# Patient Record
Sex: MALE | Race: WHITE | NOT HISPANIC OR LATINO | Employment: FULL TIME | URBAN - METROPOLITAN AREA
[De-identification: names, ages, dates, MRNs, and addresses within clinical notes are randomized per-mention and may not be internally consistent; named-entity substitution may affect disease eponyms.]

---

## 2022-08-05 ENCOUNTER — OFFICE VISIT (OUTPATIENT)
Dept: INTERNAL MEDICINE CLINIC | Facility: CLINIC | Age: 62
End: 2022-08-05
Payer: COMMERCIAL

## 2022-08-05 VITALS
RESPIRATION RATE: 18 BRPM | HEART RATE: 85 BPM | TEMPERATURE: 97.8 F | BODY MASS INDEX: 23 KG/M2 | HEIGHT: 69 IN | OXYGEN SATURATION: 98 % | WEIGHT: 155.3 LBS | DIASTOLIC BLOOD PRESSURE: 88 MMHG | SYSTOLIC BLOOD PRESSURE: 120 MMHG

## 2022-08-05 DIAGNOSIS — Z11.4 SCREENING FOR HIV (HUMAN IMMUNODEFICIENCY VIRUS): ICD-10-CM

## 2022-08-05 DIAGNOSIS — Z23 ENCOUNTER FOR IMMUNIZATION: ICD-10-CM

## 2022-08-05 DIAGNOSIS — Z00.00 ANNUAL PHYSICAL EXAM: ICD-10-CM

## 2022-08-05 DIAGNOSIS — M62.838 MUSCLE SPASMS OF NECK: Primary | ICD-10-CM

## 2022-08-05 DIAGNOSIS — R03.0 ELEVATED BLOOD-PRESSURE READING WITHOUT DIAGNOSIS OF HYPERTENSION: ICD-10-CM

## 2022-08-05 DIAGNOSIS — F17.210 TOBACCO DEPENDENCE DUE TO CIGARETTES: ICD-10-CM

## 2022-08-05 DIAGNOSIS — Z11.59 NEED FOR HEPATITIS C SCREENING TEST: ICD-10-CM

## 2022-08-05 PROBLEM — Z12.2 SCREENING FOR LUNG CANCER: Status: ACTIVE | Noted: 2022-08-05

## 2022-08-05 PROCEDURE — 99203 OFFICE O/P NEW LOW 30 MIN: CPT | Performed by: STUDENT IN AN ORGANIZED HEALTH CARE EDUCATION/TRAINING PROGRAM

## 2022-08-05 PROCEDURE — 90471 IMMUNIZATION ADMIN: CPT | Performed by: STUDENT IN AN ORGANIZED HEALTH CARE EDUCATION/TRAINING PROGRAM

## 2022-08-05 PROCEDURE — 3725F SCREEN DEPRESSION PERFORMED: CPT | Performed by: STUDENT IN AN ORGANIZED HEALTH CARE EDUCATION/TRAINING PROGRAM

## 2022-08-05 PROCEDURE — 90677 PCV20 VACCINE IM: CPT | Performed by: STUDENT IN AN ORGANIZED HEALTH CARE EDUCATION/TRAINING PROGRAM

## 2022-08-05 NOTE — PROGRESS NOTES
INTERNAL MEDICINE OFFICE VISIT NOTE  [facility]    NAME: Yazmin Cm  AGE: 57 y o  SEX: male    DATE OF ENCOUNTER: 8/5/2022      Chief Complaint   Patient presents with   [facility]     Pt feels tension in his neck that feels like something is being pinched     Last time seen by a PCP was about 20 years  Reports he has never been diagnosed with any medical conditions  He did fall in 20016 when he slipped on the Ice and hurt his left hand  C/o left neck muscle "tension"  Feels is muscular  Denies numbness of tingling of his arm  Has not tried anything in the past  He is concerned it is related to his neck  Blood pressure today 120/88 however he reports in the past to have been elevated as high in the 212T for systolic  Not on medications at the moment  Denies h/o Depression, anxiety Other psychiatric history  Significant FHx: known Colon Ca  Shx: smokes 1 ppd for about 40 years,  Tobacco, marijuana, illict drug use  Denies alcohol use  NO Environmental exposures  Works as a truck dirver  NO Available labs or imaging reports to review        ASSESSMENT/PLAN:  Diagnoses and all orders for this visit:    Muscle spasms of neck  -     Diclofenac Sodium (VOLTAREN) 1 %; Apply 2 g topically 4 (four) times a day  -     XR spine cervical complete 4 or 5 vw non injury; Future    Elevated blood pressure readings  Advised him to monitor at home and bring log on next visit    Need for hepatitis C screening test  -     Hepatitis C Antibody ([facility]); Future    Encounter for immunization  -     Pneumococcal Conjugate Vaccine 20-valent (PCV20)    Screening for HIV (human immunodeficiency virus)  -     HIV 1/2 Antigen/Antibody (4th Generation) w Reflex [facility]; Future    Annual physical exam  -     CBC and differential; Future  -     Comprehensive metabolic panel; Future  -     HEMOGLOBIN A1C W/ EAG ESTIMATION;  Future  -     TSH, 3rd generation with Free T4 reflex; Future  -     PTH, intact; Future  -     Vitamin D 25 hydroxy; Future  -     Lipid Panel with Direct LDL reflex; Future        There is no immunization history for the selected administration types on file for this patient  HISTORY OF PRESENT ILLNESS:  Anders Mejia is a 57 y o  male  As above    Review of Systems   Constitutional: Negative for appetite change, diaphoresis, fatigue and fever  HENT: Negative for rhinorrhea and sore throat  Eyes: Negative  Negative for visual disturbance  Respiratory: Negative for apnea, cough, choking, chest tightness and shortness of breath  Cardiovascular: Negative for chest pain, palpitations and leg swelling  Gastrointestinal: Negative for abdominal distention, abdominal pain, constipation, diarrhea, nausea and vomiting  Endocrine: Negative  Genitourinary: Negative  Musculoskeletal: Positive for neck pain  Skin: Negative  Negative for rash and wound  Neurological: Negative for dizziness and headaches  Hematological: Negative  Psychiatric/Behavioral: Negative  OBJECTIVE:  Vitals:    08/05/22 1300   BP: 120/88   BP Location: Left arm   Patient Position: Sitting   Cuff Size: Standard   Pulse: 85   Resp: 18   Temp: 97 8 °F (36 6 °C)   SpO2: 98%   Weight: 70 4 kg (155 lb 4 8 oz)   Height: 5' 9" (1 753 m)       Physical Exam:   GENERAL: NAD, Normal appearance  Non diaphoretic, non-toxic, not ill-appearing, well-developed, well-nourished  NEUROLOGIC:  Alert/oriented x3  No motor or sensory deficits  HEENT:  NC/AT, PERRL, EOMI, MMM, no scleral icterus  CARDIAC:  RRR, +S1/S2, no S3/S4 heard, no m/g/r  PULMONARY:  non-labored breathing, CTA B/L, no wheezing/rales/rhonci appreciated at time of encounter  ABDOMEN:  Soft, NT/ND, +BS, no rebound/guarding/rigidity  Extremities:  2+ Pulses in DP/PT   No edema, cyanosis, or clubbing  SKIN:  No rashes or erythema        Current Outpatient Medications:     Diclofenac Sodium (VOLTAREN) 1 %, Apply 2 g topically 4 (four) times a day, Disp: 100 g, Rfl: 1    naproxen (NAPROSYN) 500 mg tablet, 1 tab PO q12 hours PRN Pain (Patient not taking: Reported on 8/5/2022), Disp: 20 tablet, Rfl: 0    Past Medical History:   Diagnosis Date    Lyme disease      Past Surgical History:   Procedure Laterality Date    HERNIA REPAIR Bilateral      Family History   Problem Relation Age of Onset    Pneumonia Mother     Atrial fibrillation Mother     Diabetes type II Father     Heart attack Sister     Hyperlipidemia Sister     Hypertension Sister      Social History     Socioeconomic History    Marital status: /Civil Union     Spouse name: Not on file    Number of children: Not on file    Years of education: Not on file    Highest education level: Not on file   Occupational History    Not on file   Tobacco Use    Smoking status: Current Every Day Smoker     Packs/day: 1 00    Smokeless tobacco: Never Used   Substance and Sexual Activity    Alcohol use: No    Drug use: No    Sexual activity: Not on file   Other Topics Concern    Not on file   Social History Narrative    Not on file     Social Determinants of Health     Financial Resource Strain: Not on file   Food Insecurity: Not on file   Transportation Needs: Not on file   Physical Activity: Not on file   Stress: Not on file   Social Connections: Not on file   Intimate Partner Violence: Not on file   Housing Stability: Not on file     Social History     Tobacco Use   Smoking Status Current Every Day Smoker    Packs/day: 1 00   Smokeless Tobacco Never Used     Social History     Substance and Sexual Activity   Alcohol Use No       Social History     Substance and Sexual Activity   Drug Use No         TCM Call     None      TCM Call     None           Natalya Arredondo, [address]  [facility]

## 2022-08-08 ENCOUNTER — HOSPITAL ENCOUNTER (OUTPATIENT)
Dept: RADIOLOGY | Facility: HOSPITAL | Age: 62
Discharge: HOME | End: 2022-08-08
Payer: COMMERCIAL

## 2022-08-08 ENCOUNTER — APPOINTMENT (OUTPATIENT)
Dept: LAB | Facility: CLINIC | Age: 62
End: 2022-08-08
Payer: COMMERCIAL

## 2022-08-08 DIAGNOSIS — Z00.00 ANNUAL PHYSICAL EXAM: ICD-10-CM

## 2022-08-08 DIAGNOSIS — M62.838 MUSCLE SPASMS OF NECK: ICD-10-CM

## 2022-08-08 DIAGNOSIS — Z11.59 NEED FOR HEPATITIS C SCREENING TEST: ICD-10-CM

## 2022-08-08 DIAGNOSIS — Z11.4 SCREENING FOR HIV (HUMAN IMMUNODEFICIENCY VIRUS): ICD-10-CM

## 2022-08-08 LAB
25(OH)D3 SERPL-MCNC: 15.3 NG/ML (ref 30–100)
ALBUMIN SERPL BCP-MCNC: 3.8 G/DL (ref 3.5–5)
ALP SERPL-CCNC: 51 U/L (ref 34–104)
ALT SERPL W P-5'-P-CCNC: 28 U/L (ref 7–52)
ANION GAP SERPL CALCULATED.3IONS-SCNC: 4 MMOL/L (ref 4–13)
AST SERPL W P-5'-P-CCNC: 23 U/L (ref 13–39)
BASOPHILS # BLD AUTO: 0.02 THOUSANDS/ΜL (ref 0–0.1)
BASOPHILS NFR BLD AUTO: 1 % (ref 0–1)
BILIRUB SERPL-MCNC: 0.72 MG/DL (ref 0.2–1)
BUN SERPL-MCNC: 12 MG/DL (ref 5–25)
CALCIUM SERPL-MCNC: 8.7 MG/DL (ref 8.4–10.2)
CHLORIDE SERPL-SCNC: 104 MMOL/L (ref 96–108)
CHOLEST SERPL-MCNC: 170 MG/DL
CO2 SERPL-SCNC: 32 MMOL/L (ref 21–32)
CREAT SERPL-MCNC: 0.74 MG/DL (ref 0.6–1.3)
EOSINOPHIL # BLD AUTO: 0.1 THOUSAND/ΜL (ref 0–0.61)
EOSINOPHIL NFR BLD AUTO: 2 % (ref 0–6)
ERYTHROCYTE [DISTWIDTH] IN BLOOD BY AUTOMATED COUNT: 14.6 % (ref 11.6–15.1)
EST. AVERAGE GLUCOSE BLD GHB EST-MCNC: 120 MG/DL
GFR SERPL CREATININE-BSD FRML MDRD: 98 ML/MIN/1.73SQ M
GLUCOSE P FAST SERPL-MCNC: 86 MG/DL (ref 65–99)
HBA1C MFR BLD: 5.8 %
HCT VFR BLD AUTO: 48 % (ref 36.5–49.3)
HCV AB SER QL: NORMAL
HDLC SERPL-MCNC: 34 MG/DL
HGB BLD-MCNC: 16.1 G/DL (ref 12–17)
IMM GRANULOCYTES # BLD AUTO: 0.01 THOUSAND/UL (ref 0–0.2)
IMM GRANULOCYTES NFR BLD AUTO: 0 % (ref 0–2)
LDLC SERPL CALC-MCNC: 103 MG/DL (ref 0–100)
LYMPHOCYTES # BLD AUTO: 1.31 THOUSANDS/ΜL (ref 0.6–4.47)
LYMPHOCYTES NFR BLD AUTO: 30 % (ref 14–44)
MCH RBC QN AUTO: 32.1 PG (ref 26.8–34.3)
MCHC RBC AUTO-ENTMCNC: 33.5 G/DL (ref 31.4–37.4)
MCV RBC AUTO: 96 FL (ref 82–98)
MONOCYTES # BLD AUTO: 0.95 THOUSAND/ΜL (ref 0.17–1.22)
MONOCYTES NFR BLD AUTO: 22 % (ref 4–12)
NEUTROPHILS # BLD AUTO: 2.01 THOUSANDS/ΜL (ref 1.85–7.62)
NEUTS SEG NFR BLD AUTO: 45 % (ref 43–75)
NRBC BLD AUTO-RTO: 0 /100 WBCS
PLATELET # BLD AUTO: 115 THOUSANDS/UL (ref 149–390)
PMV BLD AUTO: 12.1 FL (ref 8.9–12.7)
POTASSIUM SERPL-SCNC: 4 MMOL/L (ref 3.5–5.3)
PROT SERPL-MCNC: 6.3 G/DL (ref 6.4–8.4)
PTH-INTACT SERPL-MCNC: 88.8 PG/ML (ref 18.4–80.1)
RBC # BLD AUTO: 5.02 MILLION/UL (ref 3.88–5.62)
SODIUM SERPL-SCNC: 140 MMOL/L (ref 135–147)
TRIGL SERPL-MCNC: 164 MG/DL
TSH SERPL DL<=0.05 MIU/L-ACNC: 0.55 UIU/ML (ref 0.45–4.5)
WBC # BLD AUTO: 4.4 THOUSAND/UL (ref 4.31–10.16)

## 2022-08-08 PROCEDURE — 80061 LIPID PANEL: CPT

## 2022-08-08 PROCEDURE — 83970 ASSAY OF PARATHORMONE: CPT

## 2022-08-08 PROCEDURE — 86803 HEPATITIS C AB TEST: CPT

## 2022-08-08 PROCEDURE — 84443 ASSAY THYROID STIM HORMONE: CPT

## 2022-08-08 PROCEDURE — 82306 VITAMIN D 25 HYDROXY: CPT

## 2022-08-08 PROCEDURE — 85025 COMPLETE CBC W/AUTO DIFF WBC: CPT

## 2022-08-08 PROCEDURE — 87389 HIV-1 AG W/HIV-1&-2 AB AG IA: CPT

## 2022-08-08 PROCEDURE — 83036 HEMOGLOBIN GLYCOSYLATED A1C: CPT

## 2022-08-08 PROCEDURE — 72050 X-RAY EXAM NECK SPINE 4/5VWS: CPT

## 2022-08-08 PROCEDURE — 80053 COMPREHEN METABOLIC PANEL: CPT

## 2022-08-08 PROCEDURE — 36415 COLL VENOUS BLD VENIPUNCTURE: CPT

## 2022-08-09 DIAGNOSIS — N25.81 SECONDARY HYPERPARATHYROIDISM (HCC): Primary | ICD-10-CM

## 2022-08-09 DIAGNOSIS — E78.2 MIXED HYPERLIPIDEMIA: ICD-10-CM

## 2022-08-09 LAB — HIV 1+2 AB+HIV1 P24 AG SERPL QL IA: NORMAL

## 2022-08-09 RX ORDER — ROSUVASTATIN CALCIUM 5 MG/1
5 TABLET, COATED ORAL DAILY
Qty: 90 TABLET | Refills: 1 | Status: SHIPPED | OUTPATIENT
Start: 2022-08-09

## 2022-08-09 RX ORDER — ERGOCALCIFEROL 1.25 MG/1
50000 CAPSULE ORAL WEEKLY
Qty: 6 CAPSULE | Refills: 0 | Status: SHIPPED | OUTPATIENT
Start: 2022-08-09 | End: 2022-09-14

## 2022-08-09 NOTE — PROGRESS NOTES
Secondary hyperparathyroidism due to vitamin-D deficiency  Prescription sent for high-dose vitamin D for 6 weeks to be followed by OTC vitamin-D  Hyperlipidemia mixed type, ASCVD risk of 15 3     Prescription sent for rosuvastatin 5 mg daily

## 2022-08-11 ENCOUNTER — TELEPHONE (OUTPATIENT)
Dept: INTERNAL MEDICINE CLINIC | Facility: CLINIC | Age: 62
End: 2022-08-11

## 2022-08-11 NOTE — TELEPHONE ENCOUNTER
----- Message from Yung Terry MD sent at 8/9/2022  2:20 PM EDT -----  Please notify patient that I reviewed his lab work  His vitamin-D level was low and his parathyroid level was high which may indicate that this is driven by his low vitamin-D  I would like him to start supplemental vitamin-D  Prescription sent to pharmacy  Additionally his cholesterol is elevated at the level that he will benefit from cholesterol medication  Prescription sent to pharmacy     We can discuss this further on his next visit

## 2022-08-11 NOTE — RESULT ENCOUNTER NOTE
Please notify patient that I reviewed his neck x-ray  It does show that he has slight movement of the C3 vertebrae towards the front and some other mild degenerative changes  We can discuss this further on his next visit and and together we can decide on well-being next step of our plan

## 2022-08-11 NOTE — TELEPHONE ENCOUNTER
----- Message from Murali Callaway MD sent at 8/9/2022  2:20 PM EDT -----  Please notify patient that I reviewed his lab work  His vitamin-D level was low and his parathyroid level was high which may indicate that this is driven by his low vitamin-D  I would like him to start supplemental vitamin-D  Prescription sent to pharmacy  Additionally his cholesterol is elevated at the level that he will benefit from cholesterol medication  Prescription sent to pharmacy     We can discuss this further on his next visit

## 2022-08-25 ENCOUNTER — TELEPHONE (OUTPATIENT)
Dept: INTERNAL MEDICINE CLINIC | Facility: CLINIC | Age: 62
End: 2022-08-25

## 2022-08-25 NOTE — TELEPHONE ENCOUNTER
Called pt tried to speak to him and he could not ear me tried 2 times and he sent me to  which vm is full could not leave a message

## 2022-08-26 ENCOUNTER — OFFICE VISIT (OUTPATIENT)
Dept: INTERNAL MEDICINE CLINIC | Facility: CLINIC | Age: 62
End: 2022-08-26
Payer: COMMERCIAL

## 2022-08-26 VITALS
RESPIRATION RATE: 18 BRPM | SYSTOLIC BLOOD PRESSURE: 140 MMHG | OXYGEN SATURATION: 96 % | WEIGHT: 158.8 LBS | DIASTOLIC BLOOD PRESSURE: 84 MMHG | BODY MASS INDEX: 23.52 KG/M2 | TEMPERATURE: 97.5 F | HEIGHT: 69 IN | HEART RATE: 72 BPM

## 2022-08-26 DIAGNOSIS — Z71.89 ENCOUNTER FOR MEDICATION COUNSELING: ICD-10-CM

## 2022-08-26 DIAGNOSIS — M62.838 MUSCLE SPASMS OF NECK: ICD-10-CM

## 2022-08-26 DIAGNOSIS — Z00.00 ANNUAL PHYSICAL EXAM: ICD-10-CM

## 2022-08-26 DIAGNOSIS — N25.81 SECONDARY HYPERPARATHYROIDISM (HCC): Primary | ICD-10-CM

## 2022-08-26 DIAGNOSIS — Z23 ENCOUNTER FOR IMMUNIZATION: ICD-10-CM

## 2022-08-26 PROCEDURE — 99214 OFFICE O/P EST MOD 30 MIN: CPT | Performed by: STUDENT IN AN ORGANIZED HEALTH CARE EDUCATION/TRAINING PROGRAM

## 2022-08-26 NOTE — PROGRESS NOTES
INTERNAL MEDICINE OFFICE VISIT NOTE  [address] [facility] [place]    NAME: Tutu Carter  AGE: 57 y o  SEX: male    DATE OF ENCOUNTER: 8/26/2022      Chief Complaint   Patient presents with    Follow-up     3 weeks; wants to talk about cholesterol and vitamin d2 medication      Last visit on 08/05/22  Will like to discuss cholesterol medication and vitamin D deficiency along with supplementation  Muscle spasms of neck  Cervical x-ray showed anterior subluxation of C3 upon C4 and degenerative arthritic changes from C4 through T1  Reports the Voltaren gel has been very helpful and controlling muscle spasms     Elevated blood pressure  Today 140/84    Secondary hyperparathyroidism  Due to low vitamin-D levels  Has not started supplementation and has multiple questions about this        ASSESSMENT/PLAN:    Problem List Items Addressed This Visit            Annual physical exam  Vaccination and Cancer screening discussed  Deferred referral for Colon cancer screening for the visit  Needs LDCT for lung cancer screening     Secondary hyperparathyroidism ([facility] [address] )  Start high-dose vitamin-D supplementation follow-up by 800-1000 units daily       Musculoskeletal and Integument    Muscle spasms of neck  Continue Voltaren gel as needed      Other Visit Diagnoses         Encounter for medication counseling     Benefit and risk reviewed with patient  Adverse effects of statin reviewed           Immunization History   Administered Date(s) Administered    Pneumococcal Conjugate Vaccine 20-valent (Pcv20), Polysace 08/05/2022         HISTORY OF PRESENT ILLNESS:  Tutu Carter is a 57 y o  male  As above    Review of Systems   Constitutional: Negative for appetite change, diaphoresis, fatigue and fever  HENT: Negative for rhinorrhea and sore throat  Eyes: Negative  Negative for visual disturbance  Respiratory: Negative for apnea, cough, choking, chest tightness and shortness of breath      Cardiovascular: Negative for chest pain, palpitations and leg swelling  Gastrointestinal: Negative for abdominal distention, abdominal pain, constipation, diarrhea, nausea and vomiting  Endocrine: Negative  Genitourinary: Negative  Musculoskeletal: Positive for neck pain  Skin: Negative  Neurological: Negative for dizziness and headaches  Hematological: Negative  Psychiatric/Behavioral: Negative  OBJECTIVE:  Vitals:    08/26/22 1313   BP: 140/84   BP Location: Left arm   Patient Position: Sitting   Cuff Size: Adult   Pulse: 72   Resp: 18   Temp: 97 5 °F (36 4 °C)   TempSrc: Temporal   SpO2: 96%   Weight: 72 kg (158 lb 12 8 oz)   Height: 5' 9" (1 753 m)       Physical Exam:   GENERAL: NAD, Normal appearance  Non diaphoretic, non-toxic, not ill-appearing, well-developed, well-nourished  NEUROLOGIC:  Alert/oriented x3  No motor or sensory deficits  HEENT:  NC/AT, PERRL, EOMI, MMM, no scleral icterus  CARDIAC:  RRR, +S1/S2, no S3/S4 heard, no m/g/r  PULMONARY:  non-labored breathing, CTA B/L, no wheezing/rales/rhonci appreciated at time of encounter  ABDOMEN:  Soft, NT/ND, +BS, no rebound/guarding/rigidity  Extremities:  2+ Pulses in DP/PT   No edema, cyanosis, or clubbing  SKIN:  No rashes or erythema        Current Outpatient Medications:     Diclofenac Sodium (VOLTAREN) 1 %, Apply 2 g topically 4 (four) times a day, Disp: 100 g, Rfl: 1    ergocalciferol (VITAMIN D2) 50,000 units, Take 1 capsule (50,000 Units total) by mouth once a week for 6 doses, Disp: 6 capsule, Rfl: 0    rosuvastatin (CRESTOR) 5 mg tablet, Take 1 tablet (5 mg total) by mouth daily, Disp: 90 tablet, Rfl: 1      TCM Call     None      TCM Call     None             Makayla Andrew MD  [address]  [facility]

## 2022-08-26 NOTE — PATIENT INSTRUCTIONS
Start taking the high-dose vitamin D once per week for 6 weeks  After finishing, start taking over-the-counter vitamin-D supplement 800-1000 units per day

## 2022-11-21 NOTE — PROGRESS NOTES
INTERNAL MEDICINE OFFICE VISIT NOTE  [address] [facility] [place]    NAME: Jeaneen Simmonds  AGE: 60 y o  SEX: male    DATE OF ENCOUNTER: 11/21/2022      Chief Complaint   Patient presents with   • Follow-up     3M     Last visit on 8/26  No new complaints  Reports Compliance with statin and OTC vitamin-D    Review of Systems  10 point ROS negative except per HPI    OBJECTIVE:  Vitals:    11/22/22 1257   BP: 140/60   BP Location: Left arm   Patient Position: Sitting   Cuff Size: Standard   Pulse: 81   Temp: (!) 97 4 °F (36 3 °C)   SpO2: 98%   Weight: 74 8 kg (164 lb 12 8 oz)   Height: 5' 9" (1 753 m)       Physical Exam:   GENERAL: NAD, Normal appearance  Non diaphoretic, non-toxic, not ill-appearing, well-developed, well-nourished  NEUROLOGIC:  Alert/oriented x3  No motor or sensory deficits  HEENT:  NC/AT, PERRL, EOMI, MMM, no scleral icterus  CARDIAC:  RRR, +S1/S2, no S3/S4 heard, no m/g/r  PULMONARY:  non-labored breathing, CTA B/L, no wheezing/rales/rhonci appreciated at time of encounter  ABDOMEN:  Soft, NT/ND, +BS, no rebound/guarding/rigidity  Extremities:  2+ Pulses in DP/PT  No edema, cyanosis, or clubbing  SKIN:  No rashes or erythema    ASSESSMENT/PLAN:    1  Pure hypercholesterolemia  Assessment & Plan:  Lab Results   Component Value Date    LDLCALC 103 (H) 08/08/2022       The 10-year ASCVD risk score (Tico RANGEL, et al , 2019) is: 20 5%    Values used to calculate the score:      Age: 60 years      Sex: Male      Is Non- : No      Diabetic: No      Tobacco smoker: Yes      Systolic Blood Pressure: 451 mmHg      Is BP treated: No      HDL Cholesterol: 34 mg/dL      Total Cholesterol: 170 mg/dL    Currently on Crestor 5 mg  Repeat lipid panel      2  Mixed hyperlipidemia  -     Lipid Panel with Direct LDL reflex; Future    3  Screening for lung cancer  -     CT lung screening program; Future; Expected date: 11/22/2022    4   Vitamin D deficiency  Assessment & Plan:  Lab Results   Component Value Date    [id number] 15 3 (L) 08/08/2022     Status post high dose supplementation  Currently on low-dose OT supplementation          Current Outpatient Medications:   •  Diclofenac Sodium (VOLTAREN) 1 %, Apply 2 g topically 4 (four) times a day, Disp: 100 g, Rfl: 1  •  ergocalciferol (VITAMIN D2) 50,000 units, Take 1 capsule (50,000 Units total) by mouth once a week for 6 doses, Disp: 6 capsule, Rfl: 0  •  rosuvastatin (CRESTOR) 5 mg tablet, Take 1 tablet (5 mg total) by mouth daily, Disp: 90 tablet, Rfl: 1    TCM Call     None      TCM Call     None             Elvira Montenegro MD  [address]  [facility]

## 2022-11-22 ENCOUNTER — OFFICE VISIT (OUTPATIENT)
Dept: INTERNAL MEDICINE CLINIC | Facility: CLINIC | Age: 62
End: 2022-11-22

## 2022-11-22 VITALS
HEART RATE: 81 BPM | HEIGHT: 69 IN | BODY MASS INDEX: 24.41 KG/M2 | OXYGEN SATURATION: 98 % | TEMPERATURE: 97.4 F | DIASTOLIC BLOOD PRESSURE: 60 MMHG | WEIGHT: 164.8 LBS | SYSTOLIC BLOOD PRESSURE: 140 MMHG

## 2022-11-22 DIAGNOSIS — E78.00 PURE HYPERCHOLESTEROLEMIA: Primary | ICD-10-CM

## 2022-11-22 DIAGNOSIS — E78.2 MIXED HYPERLIPIDEMIA: ICD-10-CM

## 2022-11-22 DIAGNOSIS — E55.9 VITAMIN D DEFICIENCY: ICD-10-CM

## 2022-11-22 DIAGNOSIS — Z12.2 SCREENING FOR LUNG CANCER: ICD-10-CM

## 2022-11-22 PROBLEM — Z12.11 SCREENING FOR COLORECTAL CANCER: Status: ACTIVE | Noted: 2022-11-22

## 2022-11-22 PROBLEM — Z12.12 SCREENING FOR COLORECTAL CANCER: Status: ACTIVE | Noted: 2022-11-22

## 2022-11-22 NOTE — ASSESSMENT & PLAN NOTE
Lab Results   Component Value Date    LDLCALC 103 (H) 08/08/2022       The 10-year ASCVD risk score (Tico RANGEL, et al , 2019) is: 20 5%    Values used to calculate the score:      Age: 60 years      Sex: Male      Is Non- : No      Diabetic: No      Tobacco smoker: Yes      Systolic Blood Pressure: 412 mmHg      Is BP treated: No      HDL Cholesterol: 34 mg/dL      Total Cholesterol: 170 mg/dL    Currently on Crestor 5 mg  Repeat lipid panel

## 2022-11-22 NOTE — ASSESSMENT & PLAN NOTE
Lab Results   Component Value Date    [id number] 15 3 (L) 08/08/2022     Status post high dose supplementation  Currently on low-dose OT supplementation

## 2022-12-28 ENCOUNTER — LAB REQUISITION (OUTPATIENT)
Dept: LAB | Facility: HOSPITAL | Age: 62
End: 2022-12-28

## 2022-12-28 DIAGNOSIS — D48.5 NEOPLASM OF UNCERTAIN BEHAVIOR OF SKIN: ICD-10-CM

## 2023-01-21 PROBLEM — Z12.11 SCREENING FOR COLORECTAL CANCER: Status: RESOLVED | Noted: 2022-11-22 | Resolved: 2023-01-21

## 2023-01-21 PROBLEM — Z12.12 SCREENING FOR COLORECTAL CANCER: Status: RESOLVED | Noted: 2022-11-22 | Resolved: 2023-01-21

## 2023-01-23 ENCOUNTER — APPOINTMENT (OUTPATIENT)
Dept: LAB | Facility: CLINIC | Age: 63
End: 2023-01-23

## 2023-01-23 DIAGNOSIS — E78.2 MIXED HYPERLIPIDEMIA: ICD-10-CM

## 2023-01-23 LAB
CHOLEST SERPL-MCNC: 135 MG/DL
HDLC SERPL-MCNC: 45 MG/DL
LDLC SERPL CALC-MCNC: 76 MG/DL (ref 0–100)
TRIGL SERPL-MCNC: 71 MG/DL

## 2023-02-17 ENCOUNTER — HOSPITAL ENCOUNTER (OUTPATIENT)
Dept: CT IMAGING | Facility: HOSPITAL | Age: 63
End: 2023-02-17
Attending: STUDENT IN AN ORGANIZED HEALTH CARE EDUCATION/TRAINING PROGRAM

## 2023-02-17 DIAGNOSIS — Z12.2 SCREENING FOR LUNG CANCER: ICD-10-CM

## 2023-02-20 DIAGNOSIS — E78.2 MIXED HYPERLIPIDEMIA: ICD-10-CM

## 2023-02-20 RX ORDER — ROSUVASTATIN CALCIUM 5 MG/1
TABLET, COATED ORAL
Qty: 90 TABLET | Refills: 1 | Status: SHIPPED | OUTPATIENT
Start: 2023-02-20

## 2023-02-27 DIAGNOSIS — R91.8 MULTIPLE LUNG NODULES ON CT: Primary | ICD-10-CM

## 2023-02-27 NOTE — PROGRESS NOTES
LDCT report reviewed  Results discussed with patient  We have agreed on performing a HRCT chest and based on that will refer to pulmonary

## 2023-03-16 ENCOUNTER — HOSPITAL ENCOUNTER (OUTPATIENT)
Dept: CT IMAGING | Facility: HOSPITAL | Age: 63
End: 2023-03-16
Attending: STUDENT IN AN ORGANIZED HEALTH CARE EDUCATION/TRAINING PROGRAM

## 2023-03-16 DIAGNOSIS — R91.8 MULTIPLE LUNG NODULES ON CT: ICD-10-CM

## 2023-03-16 RX ORDER — LATANOPROST 50 UG/ML
SOLUTION/ DROPS OPHTHALMIC
COMMUNITY
Start: 2023-01-22

## 2023-03-16 RX ADMIN — IOHEXOL 85 ML: 350 INJECTION, SOLUTION INTRAVENOUS at 13:05

## 2023-03-17 ENCOUNTER — OFFICE VISIT (OUTPATIENT)
Dept: INTERNAL MEDICINE CLINIC | Facility: CLINIC | Age: 63
End: 2023-03-17

## 2023-03-17 VITALS
DIASTOLIC BLOOD PRESSURE: 78 MMHG | BODY MASS INDEX: 23.25 KG/M2 | HEART RATE: 83 BPM | WEIGHT: 157 LBS | OXYGEN SATURATION: 99 % | TEMPERATURE: 97 F | SYSTOLIC BLOOD PRESSURE: 140 MMHG | HEIGHT: 69 IN

## 2023-03-17 DIAGNOSIS — F17.210 TOBACCO DEPENDENCE DUE TO CIGARETTES: ICD-10-CM

## 2023-03-17 DIAGNOSIS — L21.9 SEBORRHEIC DERMATITIS: ICD-10-CM

## 2023-03-17 DIAGNOSIS — Z12.11 COLON CANCER SCREENING: ICD-10-CM

## 2023-03-17 DIAGNOSIS — R91.8 MULTIPLE LUNG NODULES ON CT: Primary | ICD-10-CM

## 2023-03-17 DIAGNOSIS — R79.89 HIGH SERUM PARATHYROID HORMONE (PTH): ICD-10-CM

## 2023-03-17 DIAGNOSIS — N25.81 SECONDARY HYPERPARATHYROIDISM (HCC): ICD-10-CM

## 2023-03-17 DIAGNOSIS — E78.00 PURE HYPERCHOLESTEROLEMIA: ICD-10-CM

## 2023-03-17 RX ORDER — MULTIVITAMIN
1 TABLET ORAL DAILY
COMMUNITY

## 2023-03-17 RX ORDER — KETOCONAZOLE 20 MG/G
CREAM TOPICAL DAILY
Qty: 15 G | Refills: 0 | Status: SHIPPED | OUTPATIENT
Start: 2023-03-17

## 2023-03-17 NOTE — ASSESSMENT & PLAN NOTE
Lab Results   Component Value Date    PTH 88 8 (H) 08/08/2022     Likely due to Vit D def    S/p high-dose vitamin D currently on OTC multivitamin with vitamin D  - repeat PTH

## 2023-03-17 NOTE — ASSESSMENT & PLAN NOTE
Reports he has a colonoscopy about 3 years ago  He is unsure of the name of the practice   He will review his records at home and contact us with the information to obtain report

## 2023-03-17 NOTE — PROGRESS NOTES
INTERNAL MEDICINE OFFICE VISIT NOTE  [address] [facility] [place]    NAME: Venus Todd  AGE: 60 y o  SEX: male    DATE OF ENCOUNTER: 3/17/2023       Chief Complaint   Patient presents with   • Follow-up     3 month follow up      No new complaints  LDCT was abnormal  CT with contrast recommended and completed a few days ago  NO available report to review yet  Review of Systems  10 point ROS negative except per HPI    OBJECTIVE:  Vitals:    03/17/23 1259   BP: 140/78   BP Location: Left arm   Patient Position: Sitting   Cuff Size: Standard   Pulse: 83   Temp: (!) 97 °F (36 1 °C)   SpO2: 99%   Weight: 71 2 kg (157 lb)   Height: 5' 9" (1 753 m)       Physical Exam:   GENERAL: NAD, Normal appearance  Non diaphoretic, non-toxic, not ill-appearing, well-developed, well-nourished  NEUROLOGIC:  Alert/oriented x3  HEENT:  NC/AT, EOMI, MMM, no scleral icterus  CARDIAC:  RRR, +S1/S2, no S3/S4 heard, no m/g/r  PULMONARY:  non-labored breathing, CTA B/L, no wheezing/rales/rhonci appreciated at time of encounter  ABDOMEN:  Soft, NT/ND, +BS, no rebound/guarding/rigidity  Extremities:  2+ Pulses in DP/PT  No edema, cyanosis  SKIN:  No rashes or erythema    ASSESSMENT/PLAN:    1  Multiple lung nodules on CT  Assessment & Plan:  LDCT on 2/2023 suspicious spiculated nodule  CT with contrast recommended and completed a few days ago  NO available report to review yet  -We will refer to pulmonology further evaluation    Orders:  -     Ambulatory Referral to Pulmonology; Future    2  Colon cancer screening  Assessment & Plan:  Reports he has a colonoscopy about 3 years ago  He is unsure of the name of the practice  He will review his records at home and contact us with the information to obtain report         3  Secondary hyperparathyroidism [facility])  Assessment & Plan:  Lab Results   Component Value Date    PTH 88 8 (H) 08/08/2022     Likely due to Vit D def    S/p high-dose vitamin D currently on OTC multivitamin with vitamin D  - repeat PTH    Orders:  -     PTH, intact; Future    4  Seborrheic dermatitis  -     ketoconazole (NIZORAL) 2 % cream; Apply topically daily Thin layer on affected area of face    5  High serum parathyroid hormone (PTH)  Assessment & Plan:  Lab Results   Component Value Date    PTH 88 8 (H) 08/08/2022     Likely due to Vit D def  S/p high-dose vitamin D currently on OTC multivitamin with vitamin D  - repeat PTH      6  Tobacco dependence due to cigarettes  Assessment & Plan:  1 5 ppd - Started at age 22-23  Precontemplative state  Cessation advised        7   Pure hypercholesterolemia  Assessment & Plan:  Lab Results   Component Value Date    LDLCALC 76 01/23/2023    LDLCALC 103 (H) 08/08/2022     Good response to atorvastatin 5 mg -continue          Current Outpatient Medications:   •  Diclofenac Sodium (VOLTAREN) 1 %, Apply 2 g topically 4 (four) times a day, Disp: 100 g, Rfl: 1  •  ketoconazole (NIZORAL) 2 % cream, Apply topically daily Thin layer on affected area of face, Disp: 15 g, Rfl: 0  •  latanoprost (XALATAN) 0 005 % ophthalmic solution, INSTILL 1 DROP IN BOTH EYES IN THE EVENING, Disp: , Rfl:   •  rosuvastatin (CRESTOR) 5 mg tablet, TAKE ONE TABLET BY MOUTH EVERY DAY, Disp: 90 tablet, Rfl: 200 MD Efrain Tobias [facility] [email or web address]

## 2023-03-17 NOTE — ASSESSMENT & PLAN NOTE
Lab Results   Component Value Date    LDLCALC 76 01/23/2023    LDLCALC 103 (H) 08/08/2022     Good response to atorvastatin 5 mg -continue

## 2023-03-17 NOTE — ASSESSMENT & PLAN NOTE
LDCT on 2/2023 suspicious spiculated nodule  CT with contrast recommended and completed a few days ago  NO available report to review yet    -We will refer to pulmonology further evaluation

## 2023-03-20 ENCOUNTER — TELEPHONE (OUTPATIENT)
Dept: INTERNAL MEDICINE CLINIC | Facility: CLINIC | Age: 63
End: 2023-03-20

## 2023-03-20 PROBLEM — J43.9 EMPHYSEMA/COPD (HCC): Status: ACTIVE | Noted: 2023-03-20

## 2023-03-20 NOTE — TELEPHONE ENCOUNTER
Patient called back and was given the information from his CT scan and the recommendations from [facility] to see Pulmonary ASAP  He states the soonest he will be able to make an appointment will be April 14th, or 15th

## 2023-05-16 PROBLEM — Z12.11 COLON CANCER SCREENING: Status: RESOLVED | Noted: 2023-03-17 | Resolved: 2023-05-16

## 2023-05-17 ENCOUNTER — HOSPITAL ENCOUNTER (OUTPATIENT)
Dept: PULMONOLOGY | Facility: HOSPITAL | Age: 63
Discharge: HOME | End: 2023-05-17
Attending: INTERNAL MEDICINE

## 2023-05-17 ENCOUNTER — APPOINTMENT (OUTPATIENT)
Dept: LAB | Facility: CLINIC | Age: 63
End: 2023-05-17

## 2023-05-17 DIAGNOSIS — R91.8 MULTIPLE LUNG NODULES ON CT: ICD-10-CM

## 2023-05-17 DIAGNOSIS — J44.9 CHRONIC OBSTRUCTIVE PULMONARY DISEASE, UNSPECIFIED COPD TYPE (HCC): ICD-10-CM

## 2023-05-17 DIAGNOSIS — N25.81 SECONDARY HYPERPARATHYROIDISM (HCC): ICD-10-CM

## 2023-05-17 LAB — PTH-INTACT SERPL-MCNC: 72.5 PG/ML (ref 12–88)

## 2023-05-17 RX ORDER — ALBUTEROL SULFATE 2.5 MG/3ML
2.5 SOLUTION RESPIRATORY (INHALATION) ONCE
Status: COMPLETED | OUTPATIENT
Start: 2023-05-17 | End: 2023-05-17

## 2023-05-17 RX ADMIN — ALBUTEROL SULFATE 2.5 MG: 2.5 SOLUTION RESPIRATORY (INHALATION) at 09:02

## 2023-05-18 ENCOUNTER — TELEPHONE (OUTPATIENT)
Dept: PULMONOLOGY | Facility: CLINIC | Age: 63
End: 2023-05-18

## 2023-05-18 ENCOUNTER — HOSPITAL ENCOUNTER (OUTPATIENT)
Dept: RADIOLOGY | Age: 63
Discharge: HOME | End: 2023-05-18

## 2023-05-18 DIAGNOSIS — R91.8 LUNG MASS: ICD-10-CM

## 2023-05-18 LAB — GLUCOSE SERPL-MCNC: 102 MG/DL (ref 65–140)

## 2023-05-18 NOTE — LETTER
[address] [place] [address]  [address] [facility] [address]      May 25, 2023    MRN: [id number]     Phone: [phone]     Dear Mr Stevie Bateman recently had a(n) Nuclear Medicine performed on 5/18/2023 at  [address] [place] [address] that was requested by Kiley Aguayo MD  The study was reviewed by a radiologist, which is a physician who specializes in medical imaging  The radiologist issued a report describing his or her findings  In that report there was a finding that the radiologist felt warranted further discussion with your health care provider and that discussion would be beneficial to you  The results were sent to Kiley Aguayo MD on 05/18/2023 10:34 AM  We recommend that you contact Kiley Aguayo MD at [phone] or set up an appointment to discuss the results of the imaging test  If you have already heard from Kiley Aguayo MD regarding the results of your study, you can disregard this letter  This letter is not meant to alarm you, but intended to encourage you to follow-up on your results with the provider that sent you for the imaging study  In addition, we have enclosed answers to frequently asked questions by other patients who have also received a letter to review results with their health care provider (see page two)  Thank you for choosing [address] [place] [address] for your medical imaging needs  FREQUENTLY ASKED QUESTIONS    Why am I receiving this letter? [facility] requires us to notify patients who have findings on imaging exams that may require more testing or follow-up with a health professional within the next 3 months          How serious is the finding on the imaging test?  This letter is sent to all patients who may need follow-up or more testing within the next 3 months  Receiving this letter does not necessarily mean you have a life-threatening imaging finding or disease  Recommendations in the radiologist’s imaging report are general in nature and it is up to your healthcare provider to say whether those recommendations make sense for your situation  You are strongly encouraged to talk to your health care provider about the results and ask whether additional steps need to be taken  Where can I get a copy of the final report for my recent radiology exam?  To get a full copy of the report you can access your records online at [email or web address] or please contact [facility] Medical Records Department at [phone] Monday through Friday between 8 am and 6 pm          What do I need to do now? Please contact your health care provider who requested the imaging study to discuss what further actions (if any) are needed  You may have already heard from (your ordering provider) in regard to this test in which case you can disregard this letter  NOTICE IN ACCORDANCE WITH THE [place] STATE “PATIENT TEST RESULT INFORMATION ACT OF 2018”    You are receiving this notice as a result of a determination by your diagnostic imaging service that further discussions of your test results are warranted and would be beneficial to you  The complete results of your test or tests have been or will be sent to the health care practitioner that ordered the test or tests  It is recommended that you contact your health care practitioner to discuss your results as soon as possible

## 2023-05-18 NOTE — TELEPHONE ENCOUNTER
I called and left a  for patient that I would like to discuss his PET scan results with him  I told him that I will call him back tomorrow  If he calls back, please let him know I will try calling again tomorrow, if you can find out what a good time to reach him is  Thank you

## 2023-05-19 NOTE — TELEPHONE ENCOUNTER
I called and left another  for patient  I discussed that Dr Marquise Rush feels like the uptake on the PET CT scan is inflammatory and that we would like to observe and repeat CT scan in 6 months  I also let him know his PFTs showed mild obstruction  I asked him to please return our call should he have any questions or concerns

## 2023-05-23 NOTE — TELEPHONE ENCOUNTER
I returned patient's phone call  He is okay with waiting 6 months for repeat imaging  Karen Reese He was concerned about the coronary artery calcifications  I discussed results  He is going to follow up with his PCP regarding the calcification  I discussed smoking cessation and s/s of a heart attack  He verbalized understanding

## 2023-06-16 ENCOUNTER — OFFICE VISIT (OUTPATIENT)
Dept: INTERNAL MEDICINE CLINIC | Facility: CLINIC | Age: 63
End: 2023-06-16

## 2023-06-16 VITALS
TEMPERATURE: 98.3 F | WEIGHT: 155 LBS | HEART RATE: 85 BPM | OXYGEN SATURATION: 99 % | DIASTOLIC BLOOD PRESSURE: 78 MMHG | SYSTOLIC BLOOD PRESSURE: 140 MMHG | BODY MASS INDEX: 22.96 KG/M2 | HEIGHT: 69 IN

## 2023-06-16 DIAGNOSIS — R91.8 MULTIPLE LUNG NODULES ON CT: ICD-10-CM

## 2023-06-16 DIAGNOSIS — I10 PRIMARY HYPERTENSION: Primary | ICD-10-CM

## 2023-06-16 PROBLEM — M62.838 MUSCLE SPASMS OF NECK: Status: RESOLVED | Noted: 2022-08-05 | Resolved: 2023-06-16

## 2023-06-16 PROCEDURE — 99212 OFFICE O/P EST SF 10 MIN: CPT | Performed by: STUDENT IN AN ORGANIZED HEALTH CARE EDUCATION/TRAINING PROGRAM

## 2023-06-16 RX ORDER — AMLODIPINE BESYLATE 5 MG/1
5 TABLET ORAL DAILY
Qty: 90 TABLET | Refills: 1 | Status: SHIPPED | OUTPATIENT
Start: 2023-06-16 | End: 2023-09-14

## 2023-06-16 NOTE — PROGRESS NOTES
"INTERNAL MEDICINE OFFICE VISIT NOTE  [address] [facility]    NAME: Bert Aranda  AGE: 60 y o  SEX: male    DATE OF ENCOUNTER: 6/16/2023       Chief Complaint   Patient presents with   • Follow-up     3 month follow up      Presents for follow-up on chronic conditions  Status post evaluation by pulmonary    Review of Systems  10 point ROS negative except per HPI    OBJECTIVE:  Vitals:    06/16/23 1305   BP: 140/78   BP Location: Left arm   Patient Position: Sitting   Cuff Size: Standard   Pulse: 85   Temp: 98 3 °F (36 8 °C)   SpO2: 99%   Weight: 70 3 kg (155 lb)   Height: 5' 9\" (1 753 m)       Physical Exam:   GENERAL: NAD, Normal appearance  Non diaphoretic, non-toxic, not ill-appearing, well-developed, well-nourished  NEUROLOGIC:  Alert/oriented x3  HEENT:  NC/AT, EOMI, MMM, no scleral icterus  CARDIAC:  RRR, +S1/S2, no S3/S4 heard, no m/g/r  PULMONARY:  non-labored breathing, CTA B/L, no wheezing/rales/rhonci appreciated at time of encounter  ABDOMEN:  Soft, NT/ND, +BS, no rebound/guarding/rigidity  Extremities:  2+ Pulses in DP/PT  No edema, cyanosis  SKIN:  No rashes or erythema    ASSESSMENT/PLAN:    1  Primary hypertension  Assessment & Plan:  Persistently elevated blood pressure  Amenable to start low-dose amlodipine  Adverse effects reviewed  Follow-up next visit    Orders:  -     amLODIPine (NORVASC) 5 mg tablet; Take 1 tablet (5 mg total) by mouth daily    2  Multiple lung nodules on CT  Assessment & Plan:  Status post PET scan  Unfortunately at this time he does not have insurance and cancelled his follow-up visit with pulmonary  I encouraged him today to reschedule and work-up a payment plan to his insurance becomes active               Current Outpatient Medications:   •  albuterol (ProAir HFA) 90 mcg/act inhaler, Inhale 2 puffs every 6 (six) hours as needed for wheezing, Disp: 8 5 g, Rfl: 0  •  Diclofenac Sodium (VOLTAREN) 1 %, Apply 2 g topically 4 (four) times a day, Disp: " 100 g, Rfl: 1  •  ketoconazole (NIZORAL) 2 % cream, Apply topically daily Thin layer on affected area of face, Disp: 15 g, Rfl: 0  •  latanoprost (XALATAN) 0 005 % ophthalmic solution, INSTILL 1 DROP IN BOTH EYES IN THE EVENING, Disp: , Rfl:   •  Multiple Vitamin (multivitamin) tablet, Take 1 tablet by mouth daily, Disp: , Rfl:   •  pantoprazole (PROTONIX) 40 mg tablet, Take 1 tablet (40 mg total) by mouth daily, Disp: 90 tablet, Rfl: 0  •  rosuvastatin (CRESTOR) 5 mg tablet, TAKE ONE TABLET BY MOUTH EVERY DAY, Disp: 90 tablet, Rfl: 1    TCM Call     None      TCM Call     None             Young Rojas MD  [facility]    * Please Note: This note was completed in part utilizing a dictation software  Grammatical errors, random word insertions, spelling mistakes, and incomplete sentences may be an occasional consequence of this system secondary to software limitations, ambient noise, and hardware issues  If you have any questions or concerns about the content, text, or information contained within the body of this dictation, please contact the provider for clarification  **

## 2023-06-16 NOTE — ASSESSMENT & PLAN NOTE
Status post PET scan  Unfortunately at this time he does not have insurance and cancelled his follow-up visit with pulmonary  I encouraged him today to reschedule and work-up a payment plan to his insurance becomes active

## 2023-06-16 NOTE — ASSESSMENT & PLAN NOTE
Persistently elevated blood pressure  Amenable to start low-dose amlodipine  Adverse effects reviewed    Follow-up next visit

## 2023-08-19 DIAGNOSIS — E78.2 MIXED HYPERLIPIDEMIA: ICD-10-CM

## 2023-08-21 RX ORDER — ROSUVASTATIN CALCIUM 5 MG/1
TABLET, COATED ORAL
Qty: 90 TABLET | Refills: 1 | Status: SHIPPED | OUTPATIENT
Start: 2023-08-21

## 2023-08-24 ENCOUNTER — OFFICE VISIT (OUTPATIENT)
Dept: PULMONOLOGY | Facility: CLINIC | Age: 63
End: 2023-08-24
Payer: COMMERCIAL

## 2023-08-24 VITALS
DIASTOLIC BLOOD PRESSURE: 76 MMHG | TEMPERATURE: 97 F | SYSTOLIC BLOOD PRESSURE: 122 MMHG | BODY MASS INDEX: 22.11 KG/M2 | WEIGHT: 149.3 LBS | RESPIRATION RATE: 18 BRPM | OXYGEN SATURATION: 98 % | HEART RATE: 81 BPM | HEIGHT: 69 IN

## 2023-08-24 DIAGNOSIS — F17.210 TOBACCO DEPENDENCE DUE TO CIGARETTES: ICD-10-CM

## 2023-08-24 DIAGNOSIS — R91.8 MULTIPLE LUNG NODULES ON CT: ICD-10-CM

## 2023-08-24 DIAGNOSIS — J43.9 PULMONARY EMPHYSEMA, UNSPECIFIED EMPHYSEMA TYPE (HCC): Primary | ICD-10-CM

## 2023-08-24 PROBLEM — E55.9 VITAMIN D DEFICIENCY: Chronic | Status: ACTIVE | Noted: 2022-11-22

## 2023-08-24 PROCEDURE — 99214 OFFICE O/P EST MOD 30 MIN: CPT | Performed by: INTERNAL MEDICINE

## 2023-08-24 RX ORDER — UMECLIDINIUM BROMIDE AND VILANTEROL TRIFENATATE 62.5; 25 UG/1; UG/1
1 POWDER RESPIRATORY (INHALATION) DAILY
Qty: 60 BLISTER | Refills: 3 | Status: SHIPPED | OUTPATIENT
Start: 2023-08-24 | End: 2023-09-23

## 2023-08-24 RX ORDER — SODIUM FLUORIDE 6 MG/ML
PASTE, DENTIFRICE DENTAL
COMMUNITY
Start: 2023-07-02

## 2023-08-24 NOTE — ASSESSMENT & PLAN NOTE
Remains precontemplative. Smoking cessation advised. We will continue to address during future encounters.

## 2023-08-24 NOTE — PROGRESS NOTES
Pulmonary Consultation   Fabi Cueto 60 y.o. male MRN: [id number]  8/24/2023      Assessment:    Tobacco dependence due to cigarettes  Remains precontemplative. Smoking cessation advised. We will continue to address during future encounters. Emphysema/COPD [facility])  Previous pulmonary function test showed a mild obstructive ventilatory limitation and CT chest shows centrilobular emphysema and bullous disease. He does report shortness of breath. We will start maintenance bronchodilator therapy. Plan  Start Anoro Ellipta  Continue albuterol as needed    Multiple lung nodules on CT  Lung cancer screening CT and repeat CT of the chest revealed multiple pulmonary nodules predominantly on the left side with a dominant 2.1 cm slightly spiculated nodule in the lingula. PET scan showed mild avidity in left upper lobe pulmonary nodules. The lingular nodule did not have significant uptake. I reviewed the images and reports in detail with the patient today. The nodules may represent inflammation but given his tobacco history there is a higher concern for malignancy. Will obtain CT of the chest now. If nodules are decreased in size then can continue radiographic surveillance. If nodules are stable or enlarged then will need percutaneous biopsy. Patient indicated agreement and understanding of the plan. Plan:    Diagnoses and all orders for this visit:    Pulmonary emphysema, unspecified emphysema type ([address])  -     CT chest without contrast; Future  -     umeclidinium-vilanterol (Anoro Ellipta) 62.5-25 mcg/actuation inhaler; Inhale 1 puff daily    Multiple lung nodules on CT  -     CT chest without contrast; Future    Tobacco dependence due to cigarettes  -     CT chest without contrast; Future  -     umeclidinium-vilanterol (Anoro Ellipta) 62.5-25 mcg/actuation inhaler;  Inhale 1 puff daily    Other orders  -     PreviDent 5000 Booster Plus 1.1 % PSTE; USE TO BRUSH TEETH 2-3 TIMES DAILY        No follow-ups on file. History of Present Illness   HPI:  Dora Chowdhury is a 60 y.o. male with history of emphysema who was referred after an abnormal lung cancer screen CT and repeat CT of the lungs. Lung cancer screening CT from showed multiple pulmonary nodules with a dominant 2.1 cm spiculated nodule in the lingula. A repeat CT of the chest was performed on March 16 and showed a two-point centimeter spiculated nodule is unchanged. There is a possible new left upper lung nodule. Here today for follow-up   He was last seen in April. He was referred for a PET scan which was performed on May 18, 2023 and showed at least 2 left upper lobe nodules with increased FDG avid avidity. We discussed results on the phone with the initial plan to repeat a 6 month CT as the nodules have felt to represent inflammation. He is lost about 20 pounds in the past several months. 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 However still dealing with other issues related to the health of his wife. Continues to smoke approximately pack cigarettes per day. Remains precontemplative regarding smoking cessation. He does sometimes experience shortness of breath. He is not experiencing any fevers, chills or sweats. Review of Systems   Constitutional: Negative for chills, fatigue and fever. HENT: Negative for congestion, nosebleeds, postnasal drip, rhinorrhea, sinus pressure and sore throat. Eyes: Negative for discharge, redness and itching. Respiratory: Positive for shortness of breath. Negative for cough, choking, chest tightness, wheezing and stridor. Cardiovascular: Negative for chest pain, palpitations and leg swelling. Gastrointestinal: Negative for blood in stool. Genitourinary: Negative for difficulty urinating and dysuria. Musculoskeletal: Negative for arthralgias, joint swelling and myalgias. Skin: Negative for color change and rash.    Neurological: Negative for light-headedness and headaches. Hematological: Negative for adenopathy.        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, previously worked in construction  Tobacco: 1.5 packs per day,  67.5 pack year history   Alcohol: none   Illicits:  None   Hobbies: gardening   Pets: cats   Travel: none    Meds/Allergies     Current Outpatient Medications:   •  albuterol (ProAir HFA) 90 mcg/act inhaler, Inhale 2 puffs every 6 (six) hours as needed for wheezing, Disp: 8.5 g, Rfl: 0  •  amLODIPine (NORVASC) 5 mg tablet, Take 1 tablet (5 mg total) by mouth daily, Disp: 90 tablet, Rfl: 1  •  Diclofenac Sodium (VOLTAREN) 1 %, Apply 2 g topically 4 (four) times a day, Disp: 100 g, Rfl: 1  •  ketoconazole (NIZORAL) 2 % cream, Apply topically daily Thin layer on affected area of face, Disp: 15 g, Rfl: 0  •  latanoprost (XALATAN) 0.005 % ophthalmic solution, INSTILL 1 DROP IN BOTH EYES IN THE EVENING, Disp: , Rfl:   •  Multiple Vitamin (multivitamin) tablet, Take 1 tablet by mouth daily, Disp: , Rfl:   •  pantoprazole (PROTONIX) 40 mg tablet, Take 1 tablet (40 mg total) by mouth daily, Disp: 90 tablet, Rfl: 0  •  PreviDent 5000 Booster Plus 1.1 % PSTE, USE TO BRUSH TEETH 2-3 TIMES DAILY, Disp: , Rfl:   •  rosuvastatin (CRESTOR) 5 mg tablet, TAKE ONE TABLET BY MOUTH EVERY DAY, Disp: 90 tablet, Rfl: 1  •  umeclidinium-vilanterol (Anoro Ellipta) 62.5-25 mcg/actuation inhaler, Inhale 1 puff daily, Disp: 60 blister, Rfl: 3  No Known Allergies    Vitals: Blood pressure 122/76, pulse 81, temperature (!) 97 °F (36.1 °C), temperature source Tympanic, resp. rate 18, height 5' 9" (1.753 m), weight 67.7 kg (149 lb 4.8 oz), SpO2 98 %. Body mass index is 22.05 kg/m². Oxygen Therapy  SpO2: 98 %  Oxygen Therapy: None (Room air)      Physical Exam  Physical Exam  Vitals reviewed. Constitutional:       General: He is not in acute distress. Appearance: He is well-developed. He is not ill-appearing, toxic-appearing or diaphoretic. HENT:      Head: Normocephalic and atraumatic. Right Ear: External ear normal.      Left Ear: External ear normal.      Nose: Nose normal. No congestion or rhinorrhea. Mouth/Throat:      Pharynx: No oropharyngeal exudate or posterior oropharyngeal erythema. Eyes:      General: No scleral icterus. Right eye: No discharge. Left eye: No discharge. Conjunctiva/sclera: Conjunctivae normal.      Pupils: Pupils are equal, round, and reactive to light. Neck:      Trachea: No tracheal deviation. Cardiovascular:      Rate and Rhythm: Normal rate and regular rhythm. Heart sounds: Normal heart sounds. No murmur heard. No friction rub. No gallop. Pulmonary:      Effort: Pulmonary effort is normal.      Breath sounds: Normal breath sounds. No stridor. No wheezing or rales. Musculoskeletal:      Cervical back: Normal range of motion. Right lower leg: No edema. Left lower leg: No edema. Lymphadenopathy:      Head:      Right side of head: No submental, submandibular, preauricular or posterior auricular adenopathy. Left side of head: No submental, submandibular, preauricular or posterior auricular adenopathy. Cervical: No cervical adenopathy. Skin:     General: Skin is warm and dry. Findings: No lesion or rash. Neurological:      Mental Status: He is alert and oriented to person, place, and time. Labs: I have personally reviewed pertinent lab results.   Lab Results   Component Value Date    WBC 4.40 08/08/2022    HGB 16.1 08/08/2022    HCT 48.0 08/08/2022    MCV 96 08/08/2022     (L) 08/08/2022     Lab Results   Component Value Date    CALCIUM 8.7 08/08/2022    K 4.0 08/08/2022    CO2 32 08/08/2022     08/08/2022    BUN 12 08/08/2022    CREATININE 0.74 08/08/2022     No results found for: "IGE"  Lab Results   Component Value Date    ALT 28 08/08/2022    AST 23 08/08/2022    ALKPHOS 51 08/08/2022       Imaging and other studies: I have personally reviewed pertinent reports. and I have personally reviewed pertinent films in PACS    PET 5/2023        IMPRESSION:  1. At least 2 left upper lung nodules with increased FDG activity, as above, for which malignancy should be excluded. 2. The largest left lingular nodule measuring 2.1 x 1.6 cm demonstrates minimal FDG activity, favoring a benign or low-grade neoplasm. In the absence of tissue diagnosis, continued follow-up recommended. 3. Nonspecific right pelvic bowel activity is likely physiologic but may be reassessed on follow-up PET/CT. 4. Ectatic ascending thoracic aorta measuring 4 x 4 cm. 12-month CT follow-up recommended. IMPRESSION:  1. At least 2 left upper lung nodules with increased FDG activity, as above, for which malignancy should be excluded. 2. The largest left lingular nodule measuring 2.1 x 1.6 cm demonstrates minimal FDG activity, favoring a benign or low-grade neoplasm. In the absence of tissue diagnosis, continued follow-up recommended. 3. Nonspecific right pelvic bowel activity is likely physiologic but may be reassessed on follow-up PET/CT. 4. Ectatic ascending thoracic aorta measuring 4 x 4 cm. 12-month CT follow-up recommended. CT chest 3/16/2023     FINDINGS:     LUNGS:  Moderate emphysema with bilateral upper lung scarring; adjacent nodular-like components are unchanged.     Solid 1.9 x 1.6 cm lingular nodule is unchanged (series 205, image 69).      Possible left upper lobe nodule measures approximately 0.6 x 0.5 cm and is new since prior study (series 204, image 140), and is in close proximity to airways.     PLEURA:  Unremarkable.     HEART/GREAT VESSELS: Normal heart size. There is fusiform ectasia of the ascending thoracic aorta measuring up to 4 cm. Recommendation is for follow-up low radiation dose chest CT in one year. Atherosclerotic calcifications of the aorta and coronary   artery calcifications.     MEDIASTINUM AND DEVORA:  Unremarkable.     CHEST WALL AND LOWER NECK:  Unremarkable.     VISUALIZED STRUCTURES IN THE UPPER ABDOMEN:  Calcified and noncalcified atherosclerosis of the visualized segments of the abdominal aorta.     OSSEOUS STRUCTURES:  Spinal degenerative changes are noted. No acute fracture or destructive osseous lesion.     IMPRESSION:     1.  Lingular nodule is unchanged from February 17, 2023. Tissue sampling is advised. 2.  Possible small left upper lung is new since February 17, 2023. Given change in short interval and location, finding may be due to impacted airways; attention will be made on follow-up. CT lung cancer screening 2/2023   IMPRESSION:     2.1 cm spiculated nodule in the lingula which is most concerning for malignancy. Multiple additional smaller nodules in the left apex.       Lung-RADS 4B, Very suspicious. Managment depends on clinical evaluation, patient preference and probability of malignancy. Options include chest CT (with or without contrast), PET/CT (if solid component > 8mm), and/or tissue sampling.       clinically significant or potentially clinically significant findings (non lung cancer).     Fusiform ectasia of ascending thoracic aorta measuring 43 mm. Recommendation is for follow-up low radiation dose noncontrast chest CT in one year. Pulmonary function testing:   Ordered today     Imaging        Zuleika Chambers M.D.   [facility]

## 2023-08-24 NOTE — ASSESSMENT & PLAN NOTE
Lung cancer screening CT and repeat CT of the chest revealed multiple pulmonary nodules predominantly on the left side with a dominant 2.1 cm slightly spiculated nodule in the lingula. PET scan showed mild avidity in left upper lobe pulmonary nodules. The lingular nodule did not have significant uptake. I reviewed the images and reports in detail with the patient today. The nodules may represent inflammation but given his tobacco history there is a higher concern for malignancy. Will obtain CT of the chest now. If nodules are decreased in size then can continue radiographic surveillance. If nodules are stable or enlarged then will need percutaneous biopsy. Patient indicated agreement and understanding of the plan.

## 2023-08-24 NOTE — ASSESSMENT & PLAN NOTE
Previous pulmonary function test showed a mild obstructive ventilatory limitation and CT chest shows centrilobular emphysema and bullous disease. He does report shortness of breath. We will start maintenance bronchodilator therapy.     Plan  Start Anoro Ellipta  Continue albuterol as needed

## 2023-08-29 DIAGNOSIS — K21.9 GASTROESOPHAGEAL REFLUX DISEASE WITHOUT ESOPHAGITIS: ICD-10-CM

## 2023-08-31 ENCOUNTER — HOSPITAL ENCOUNTER (OUTPATIENT)
Dept: CT IMAGING | Facility: HOSPITAL | Age: 63
End: 2023-08-31
Attending: INTERNAL MEDICINE
Payer: COMMERCIAL

## 2023-08-31 DIAGNOSIS — F17.210 TOBACCO DEPENDENCE DUE TO CIGARETTES: ICD-10-CM

## 2023-08-31 DIAGNOSIS — R91.8 MULTIPLE LUNG NODULES ON CT: ICD-10-CM

## 2023-08-31 DIAGNOSIS — J43.9 PULMONARY EMPHYSEMA, UNSPECIFIED EMPHYSEMA TYPE (HCC): ICD-10-CM

## 2023-08-31 PROCEDURE — 71250 CT THORAX DX C-: CPT

## 2023-08-31 PROCEDURE — G1004 CDSM NDSC: HCPCS

## 2023-08-31 RX ORDER — PANTOPRAZOLE SODIUM 40 MG/1
40 TABLET, DELAYED RELEASE ORAL DAILY
Qty: 90 TABLET | Refills: 0 | Status: SHIPPED | OUTPATIENT
Start: 2023-08-31

## 2023-09-07 ENCOUNTER — OFFICE VISIT (OUTPATIENT)
Dept: PULMONOLOGY | Facility: CLINIC | Age: 63
End: 2023-09-07
Payer: COMMERCIAL

## 2023-09-07 VITALS
DIASTOLIC BLOOD PRESSURE: 68 MMHG | TEMPERATURE: 97.6 F | OXYGEN SATURATION: 99 % | HEART RATE: 78 BPM | BODY MASS INDEX: 22.56 KG/M2 | WEIGHT: 152.3 LBS | SYSTOLIC BLOOD PRESSURE: 122 MMHG | HEIGHT: 69 IN

## 2023-09-07 DIAGNOSIS — J43.9 PULMONARY EMPHYSEMA, UNSPECIFIED EMPHYSEMA TYPE (HCC): ICD-10-CM

## 2023-09-07 DIAGNOSIS — R91.8 MULTIPLE LUNG NODULES ON CT: Primary | ICD-10-CM

## 2023-09-07 DIAGNOSIS — F17.219 CIGARETTE NICOTINE DEPENDENCE WITH NICOTINE-INDUCED DISORDER: ICD-10-CM

## 2023-09-07 PROCEDURE — 99406 BEHAV CHNG SMOKING 3-10 MIN: CPT | Performed by: INTERNAL MEDICINE

## 2023-09-07 PROCEDURE — 99215 OFFICE O/P EST HI 40 MIN: CPT | Performed by: INTERNAL MEDICINE

## 2023-09-07 NOTE — PROGRESS NOTES
Pulmonary Follow Up Note   Venu Segundo 60 y.o. male MRN: [id number]  9/7/2023      Assessment:    1. Multiple lung nodules on CT  · Chronic biapical scarring  · ANGIE nodules appear smaller however the largest one now appears cavitary  · This was slightly PET avid  · Pending official review from CT chest, if smaller, will repeat CT chest in 6 weeks otherwise will need to discuss biopsy    2. Cigarette nicotine dependence with nicotine-induced disorder  · 1 ppd for many years  · Has tried the patch in the past without any benefit  · Counseled on tobacco cessation for 6 minutes  · He is agreeable to trying the nicotine lozenges  -     nicotine polacrilex (COMMIT) 4 MG lozenge; Apply 1 lozenge (4 mg total) to the mouth or throat as needed for smoking cessation    3. Pulmonary emphysema, unspecified emphysema type (HCC)  · Mild obstructive airflow defect on recent PFTs  · Doing well on anoro and not requiring albuterol PRN  · Continue current regimen  · Counseled on tobacco cessation  · Undergoing work up for lung nodules     Plan:    Diagnoses and all orders for this visit:    Multiple lung nodules on CT    Cigarette nicotine dependence with nicotine-induced disorder  -     nicotine polacrilex (COMMIT) 4 MG lozenge; Apply 1 lozenge (4 mg total) to the mouth or throat as needed for smoking cessation    Pulmonary emphysema, unspecified emphysema type ([address])        Return in about 3 months (around 12/7/2023). History of Present Illness   HPI:  Venu Segundo is a 60 y.o. male who presents for follow up of COPD and a lung nodule. He recently had a repeat CT of the chest however the image study has not been formally read. He did start his inhaler anoro and reports he is doing well and not requiring his albuterol rescue inhaler. He unfortunately continues to smoke and is having a difficult time quiting. No recent illness/infections    Review of Systems   Constitutional: Negative for chills and fever.    HENT: Negative for congestion, postnasal drip and rhinorrhea. Eyes: Negative for itching. Respiratory: Positive for shortness of breath. Negative for cough, wheezing and stridor. Cardiovascular: Negative for chest pain, palpitations and leg swelling. Gastrointestinal: Negative for abdominal distention, abdominal pain, nausea and vomiting. Genitourinary: Negative for dysuria and flank pain. Musculoskeletal: Negative for arthralgias and myalgias. Skin: Negative for color change. Neurological: Negative for dizziness, light-headedness and headaches. Psychiatric/Behavioral: Negative.         Historical Information   Past Medical History:   Diagnosis Date   • Lyme disease      Past Surgical History:   Procedure Laterality Date   • HERNIA REPAIR Bilateral      Family History   Problem Relation Age of Onset   • Pneumonia Mother    • Atrial fibrillation Mother    • Diabetes type II Father    • Heart attack Sister    • Hyperlipidemia Sister    • Hypertension Sister          Meds/Allergies     Current Outpatient Medications:   •  albuterol (ProAir HFA) 90 mcg/act inhaler, Inhale 2 puffs every 6 (six) hours as needed for wheezing, Disp: 8.5 g, Rfl: 0  •  amLODIPine (NORVASC) 5 mg tablet, Take 1 tablet (5 mg total) by mouth daily, Disp: 90 tablet, Rfl: 1  •  Diclofenac Sodium (VOLTAREN) 1 %, Apply 2 g topically 4 (four) times a day, Disp: 100 g, Rfl: 1  •  ketoconazole (NIZORAL) 2 % cream, Apply topically daily Thin layer on affected area of face, Disp: 15 g, Rfl: 0  •  latanoprost (XALATAN) 0.005 % ophthalmic solution, INSTILL 1 DROP IN BOTH EYES IN THE EVENING, Disp: , Rfl:   •  Multiple Vitamin (multivitamin) tablet, Take 1 tablet by mouth daily, Disp: , Rfl:   •  nicotine polacrilex (COMMIT) 4 MG lozenge, Apply 1 lozenge (4 mg total) to the mouth or throat as needed for smoking cessation, Disp: 100 each, Rfl: 0  •  pantoprazole (PROTONIX) 40 mg tablet, TAKE ONE TABLET BY MOUTH EVERY DAY, Disp: 90 tablet, Rfl: 0  •  PreviDent 5000 Booster Plus 1.1 % PSTE, USE TO BRUSH TEETH 2-3 TIMES DAILY, Disp: , Rfl:   •  rosuvastatin (CRESTOR) 5 mg tablet, TAKE ONE TABLET BY MOUTH EVERY DAY, Disp: 90 tablet, Rfl: 1  •  umeclidinium-vilanterol (Anoro Ellipta) 62.5-25 mcg/actuation inhaler, Inhale 1 puff daily, Disp: 60 blister, Rfl: 3  No Known Allergies    Vitals: Blood pressure 122/68, pulse 78, temperature 97.6 °F (36.4 °C), temperature source Tympanic, height 5' 9" (1.753 m), weight 69.1 kg (152 lb 4.8 oz), SpO2 99 %. Body mass index is 22.49 kg/m². Oxygen Therapy  SpO2: 99 %  Oxygen Therapy: None (Room air)      Physical Exam  Physical Exam  Constitutional:       General: He is not in acute distress. Appearance: He is not diaphoretic. HENT:      Head: Normocephalic and atraumatic. Nose: Nose normal.      Mouth/Throat:      Pharynx: No oropharyngeal exudate. Eyes:      General: No scleral icterus. Conjunctiva/sclera: Conjunctivae normal.      Pupils: Pupils are equal, round, and reactive to light. Neck:      Thyroid: No thyromegaly. Vascular: No JVD. Trachea: No tracheal deviation. Cardiovascular:      Rate and Rhythm: Normal rate and regular rhythm. Heart sounds: Normal heart sounds. No murmur heard. No friction rub. No gallop. Pulmonary:      Effort: Pulmonary effort is normal. No respiratory distress. Breath sounds: Normal breath sounds. No stridor. No wheezing or rales. Abdominal:      General: Bowel sounds are normal. There is no distension. Palpations: Abdomen is soft. Tenderness: There is no abdominal tenderness. There is no guarding or rebound. Musculoskeletal:         General: No deformity. Normal range of motion. Cervical back: Normal range of motion and neck supple. Lymphadenopathy:      Cervical: No cervical adenopathy. Skin:     General: Skin is warm. Findings: No erythema or rash.    Neurological:      Mental Status: He is alert and oriented to person, place, and time. Cranial Nerves: No cranial nerve deficit. Sensory: No sensory deficit. Labs: I have personally reviewed pertinent lab results. Lab Results   Component Value Date    WBC 4.40 2022    HGB 16.1 2022    HCT 48.0 2022    MCV 96 2022     (L) 2022     Lab Results   Component Value Date    CALCIUM 8.7 2022    K 4.0 2022    CO2 32 2022     2022    BUN 12 2022    CREATININE 0.74 2022     No results found for: "IGE"  Lab Results   Component Value Date    ALT 28 2022    AST 23 2022    ALKPHOS 51 2022         Imaging and other studies: I have personally reviewed pertinent reports. and I have personally reviewed pertinent films in PACS  CT chest w/o contrast on 2023- no official read. Upon my review the ANGIE nodule appears slightly smaller although no cavitary    Pulmonary function testin2023  Mild obstructive airflow defect, no response to bronchodilators, air trapping and mildly reduced diffusion capacity    EKG, Pathology, and Other Studies: I have personally reviewed pertinent reports.    and I have personally reviewed pertinent films in PACS    Lian Farmer MD  Pulmonary and Critical Care   [facility]

## 2023-10-25 ENCOUNTER — OFFICE VISIT (OUTPATIENT)
Dept: INTERNAL MEDICINE CLINIC | Facility: CLINIC | Age: 63
End: 2023-10-25
Payer: COMMERCIAL

## 2023-10-25 VITALS
WEIGHT: 148 LBS | SYSTOLIC BLOOD PRESSURE: 130 MMHG | BODY MASS INDEX: 21.92 KG/M2 | HEIGHT: 69 IN | RESPIRATION RATE: 13 BRPM | TEMPERATURE: 97.6 F | HEART RATE: 80 BPM | DIASTOLIC BLOOD PRESSURE: 90 MMHG

## 2023-10-25 DIAGNOSIS — R73.01 IMPAIRED FASTING GLUCOSE: ICD-10-CM

## 2023-10-25 DIAGNOSIS — I10 PRIMARY HYPERTENSION: Primary | ICD-10-CM

## 2023-10-25 DIAGNOSIS — Z23 NEED FOR TETANUS BOOSTER: ICD-10-CM

## 2023-10-25 DIAGNOSIS — Z23 ENCOUNTER FOR IMMUNIZATION: ICD-10-CM

## 2023-10-25 DIAGNOSIS — R91.8 MULTIPLE LUNG NODULES ON CT: ICD-10-CM

## 2023-10-25 DIAGNOSIS — E78.00 PURE HYPERCHOLESTEROLEMIA: ICD-10-CM

## 2023-10-25 DIAGNOSIS — Z00.00 ANNUAL PHYSICAL EXAM: ICD-10-CM

## 2023-10-25 PROCEDURE — 90471 IMMUNIZATION ADMIN: CPT | Performed by: STUDENT IN AN ORGANIZED HEALTH CARE EDUCATION/TRAINING PROGRAM

## 2023-10-25 PROCEDURE — 90714 TD VACC NO PRESV 7 YRS+ IM: CPT | Performed by: STUDENT IN AN ORGANIZED HEALTH CARE EDUCATION/TRAINING PROGRAM

## 2023-10-25 PROCEDURE — 99214 OFFICE O/P EST MOD 30 MIN: CPT | Performed by: STUDENT IN AN ORGANIZED HEALTH CARE EDUCATION/TRAINING PROGRAM

## 2023-10-25 NOTE — ASSESSMENT & PLAN NOTE
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

## 2023-10-25 NOTE — ASSESSMENT & PLAN NOTE
Following with pulmonary  Most recent CT has been completed.     tentative plans for possible biopsy  Patient advised to schedule follow-up with his pulmonary team to further discuss the plan

## 2023-10-25 NOTE — ASSESSMENT & PLAN NOTE
Blood pressure today within acceptable parameters  - Currently on amlodipine 5 mg - continue  - Patient advised to monitor his blood pressure at home

## 2023-10-25 NOTE — PROGRESS NOTES
INTERNAL MEDICINE OFFICE VISIT NOTE  [facility] [place]    NAME: Cinda Kaur  AGE: 60 y.o. SEX: male    DATE OF ENCOUNTER: 10/25/2023       Chief Complaint   Patient presents with    Follow-up     Refused colonoscopy and Cologuard. Asking for Td booster. Review of Systems  10 point ROS negative except per HPI    OBJECTIVE:  Vitals:    10/25/23 1301   BP: 130/90   BP Location: Left arm   Patient Position: Sitting   Cuff Size: Standard   Pulse: 80   Resp: 13   Temp: 97.6 °F (36.4 °C)   Weight: 67.1 kg (148 lb)   Height: 5' 9" (1.753 m)       Physical Exam:   GENERAL: NAD, Normal appearance. NEUROLOGIC:  Alert/oriented x3. HEENT:  NC/AT, no scleral icterus  CARDIAC:  RRR, +S1/S2  PULMONARY:  non-labored breathing      ASSESSMENT/PLAN:    1. Primary hypertension  Assessment & Plan:  Blood pressure today acceptable parameters  - Currently on amlodipine 5 mg continue  - Patient advised to monitor his blood pressure at home    Orders:  -     Comprehensive metabolic panel; Future  -     TSH, 3rd generation with Free T4 reflex; Future    2. Impaired fasting glucose  -     Hemoglobin A1C; Future    3. Pure hypercholesterolemia  Assessment & Plan:  Reports compliance with Crestor  Lipid panel ordered    Orders:  -     CBC; Future  -     Hemoglobin A1C; Future  -     Lipid Panel With Direct LDL; Future    4. Annual physical exam  -     CBC; Future  -     Comprehensive metabolic panel; Future  -     TSH, 3rd generation with Free T4 reflex; Future  -     Hemoglobin A1C; Future  -     Lipid Panel With Direct LDL; Future    5. Encounter for immunization  -     TD VACCINE GREATER THAN OR EQUAL TO 6YO PRESERVATIVE FREE IM    6. Multiple lung nodules on CT  Assessment & Plan:  Following with pulmonary  Most recent CT has been completed. tentative plans for possible biopsy  Patient advised to schedule follow-up with his pulmonary team to further discuss the plan      7.  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  Patient instructed to complete labs 1 week before his visit      Current Outpatient Medications:     albuterol (ProAir HFA) 90 mcg/act inhaler, Inhale 2 puffs every 6 (six) hours as needed for wheezing, Disp: 8.5 g, Rfl: 0    amLODIPine (NORVASC) 5 mg tablet, Take 1 tablet (5 mg total) by mouth daily, Disp: 90 tablet, Rfl: 1    Diclofenac Sodium (VOLTAREN) 1 %, Apply 2 g topically 4 (four) times a day, Disp: 100 g, Rfl: 1    ketoconazole (NIZORAL) 2 % cream, Apply topically daily Thin layer on affected area of face, Disp: 15 g, Rfl: 0    latanoprost (XALATAN) 0.005 % ophthalmic solution, INSTILL 1 DROP IN BOTH EYES IN THE EVENING, Disp: , Rfl:     Multiple Vitamin (multivitamin) tablet, Take 1 tablet by mouth daily, Disp: , Rfl:     pantoprazole (PROTONIX) 40 mg tablet, TAKE ONE TABLET BY MOUTH EVERY DAY, Disp: 90 tablet, Rfl: 0    PreviDent 5000 Booster Plus 1.1 % PSTE, USE TO BRUSH TEETH 2-3 TIMES DAILY, Disp: , Rfl:     rosuvastatin (CRESTOR) 5 mg tablet, TAKE ONE TABLET BY MOUTH EVERY DAY, Disp: 90 tablet, Rfl: 1    umeclidinium-vilanterol (Anoro Ellipta) 62.5-25 mcg/actuation inhaler, Inhale 1 puff daily, Disp: 60 blister, Rfl: 3    nicotine polacrilex (COMMIT) 4 MG lozenge, Apply 1 lozenge (4 mg total) to the mouth or throat as needed for smoking cessation (Patient not taking: Reported on 10/25/2023), Disp: 100 each, Rfl: 0    TCM Call       None          TCM Call       None               Katherine Callaway MD  [facility]    * Please Note: This note was completed in part utilizing a dictation software.   Grammatical errors, random word insertions, spelling mistakes, and incomplete sentences may be an occasional consequence of this system secondary to software limitations, ambient noise, and hardware issues. If you have any questions or concerns about the content, text, or information contained within the body of this dictation, please contact the provider for clarification. **

## 2023-11-08 ENCOUNTER — TELEPHONE (OUTPATIENT)
Dept: PULMONOLOGY | Facility: CLINIC | Age: 63
End: 2023-11-08

## 2023-11-08 DIAGNOSIS — R91.8 LUNG NODULES: Primary | ICD-10-CM

## 2023-12-06 DIAGNOSIS — K21.9 GASTROESOPHAGEAL REFLUX DISEASE WITHOUT ESOPHAGITIS: ICD-10-CM

## 2023-12-07 ENCOUNTER — TELEPHONE (OUTPATIENT)
Dept: INTERNAL MEDICINE CLINIC | Facility: CLINIC | Age: 63
End: 2023-12-07

## 2023-12-07 RX ORDER — PANTOPRAZOLE SODIUM 40 MG/1
40 TABLET, DELAYED RELEASE ORAL DAILY
Qty: 90 TABLET | Refills: 0 | Status: SHIPPED | OUTPATIENT
Start: 2023-12-07

## 2023-12-18 DIAGNOSIS — I10 PRIMARY HYPERTENSION: ICD-10-CM

## 2023-12-18 RX ORDER — AMLODIPINE BESYLATE 5 MG/1
5 TABLET ORAL DAILY
Qty: 90 TABLET | Refills: 1 | Status: SHIPPED | OUTPATIENT
Start: 2023-12-18

## 2024-01-12 ENCOUNTER — APPOINTMENT (OUTPATIENT)
Dept: LAB | Facility: CLINIC | Age: 64
End: 2024-01-12
Payer: COMMERCIAL

## 2024-01-12 DIAGNOSIS — I10 PRIMARY HYPERTENSION: ICD-10-CM

## 2024-01-12 DIAGNOSIS — R73.01 IMPAIRED FASTING GLUCOSE: ICD-10-CM

## 2024-01-12 DIAGNOSIS — Z00.00 ANNUAL PHYSICAL EXAM: ICD-10-CM

## 2024-01-12 DIAGNOSIS — E78.00 PURE HYPERCHOLESTEROLEMIA: ICD-10-CM

## 2024-01-12 LAB
ALBUMIN SERPL BCP-MCNC: 4 G/DL (ref 3.5–5)
ALP SERPL-CCNC: 65 U/L (ref 34–104)
ALT SERPL W P-5'-P-CCNC: 15 U/L (ref 7–52)
ANION GAP SERPL CALCULATED.3IONS-SCNC: 3 MMOL/L
AST SERPL W P-5'-P-CCNC: 18 U/L (ref 13–39)
BILIRUB SERPL-MCNC: 0.43 MG/DL (ref 0.2–1)
BUN SERPL-MCNC: 15 MG/DL (ref 5–25)
CALCIUM SERPL-MCNC: 9.3 MG/DL (ref 8.4–10.2)
CHLORIDE SERPL-SCNC: 103 MMOL/L (ref 96–108)
CHOLEST SERPL-MCNC: 198 MG/DL
CO2 SERPL-SCNC: 32 MMOL/L (ref 21–32)
CREAT SERPL-MCNC: 0.9 MG/DL (ref 0.6–1.3)
ERYTHROCYTE [DISTWIDTH] IN BLOOD BY AUTOMATED COUNT: 14.6 % (ref 11.6–15.1)
GFR SERPL CREATININE-BSD FRML MDRD: 89 ML/MIN/1.73SQ M
GLUCOSE P FAST SERPL-MCNC: 112 MG/DL (ref 65–99)
HCT VFR BLD AUTO: 45.3 % (ref 36.5–49.3)
HDLC SERPL-MCNC: 43 MG/DL
HGB BLD-MCNC: 15.1 G/DL (ref 12–17)
LDLC SERPL CALC-MCNC: 134 MG/DL (ref 0–100)
MCH RBC QN AUTO: 31.9 PG (ref 26.8–34.3)
MCHC RBC AUTO-ENTMCNC: 33.3 G/DL (ref 31.4–37.4)
MCV RBC AUTO: 96 FL (ref 82–98)
PLATELET # BLD AUTO: 195 THOUSANDS/UL (ref 149–390)
PMV BLD AUTO: 10.2 FL (ref 8.9–12.7)
POTASSIUM SERPL-SCNC: 4.1 MMOL/L (ref 3.5–5.3)
PROT SERPL-MCNC: 6.5 G/DL (ref 6.4–8.4)
RBC # BLD AUTO: 4.73 MILLION/UL (ref 3.88–5.62)
SODIUM SERPL-SCNC: 138 MMOL/L (ref 135–147)
TRIGL SERPL-MCNC: 106 MG/DL
TSH SERPL DL<=0.05 MIU/L-ACNC: 1.29 UIU/ML (ref 0.45–4.5)
WBC # BLD AUTO: 6.42 THOUSAND/UL (ref 4.31–10.16)

## 2024-01-12 PROCEDURE — 36415 COLL VENOUS BLD VENIPUNCTURE: CPT

## 2024-01-12 PROCEDURE — 83036 HEMOGLOBIN GLYCOSYLATED A1C: CPT

## 2024-01-12 PROCEDURE — 85027 COMPLETE CBC AUTOMATED: CPT

## 2024-01-12 PROCEDURE — 84443 ASSAY THYROID STIM HORMONE: CPT

## 2024-01-12 PROCEDURE — 80061 LIPID PANEL: CPT

## 2024-01-12 PROCEDURE — 80053 COMPREHEN METABOLIC PANEL: CPT

## 2024-01-13 LAB
EST. AVERAGE GLUCOSE BLD GHB EST-MCNC: 123 MG/DL
HBA1C MFR BLD: 5.9 %

## 2024-01-15 ENCOUNTER — OFFICE VISIT (OUTPATIENT)
Dept: INTERNAL MEDICINE CLINIC | Facility: CLINIC | Age: 64
End: 2024-01-15
Payer: COMMERCIAL

## 2024-01-15 VITALS
BODY MASS INDEX: 22.22 KG/M2 | HEIGHT: 69 IN | OXYGEN SATURATION: 98 % | HEART RATE: 73 BPM | SYSTOLIC BLOOD PRESSURE: 148 MMHG | TEMPERATURE: 98 F | WEIGHT: 150 LBS | DIASTOLIC BLOOD PRESSURE: 82 MMHG

## 2024-01-15 DIAGNOSIS — J43.9 PULMONARY EMPHYSEMA, UNSPECIFIED EMPHYSEMA TYPE (HCC): ICD-10-CM

## 2024-01-15 DIAGNOSIS — E78.00 PURE HYPERCHOLESTEROLEMIA: ICD-10-CM

## 2024-01-15 DIAGNOSIS — I10 PRIMARY HYPERTENSION: ICD-10-CM

## 2024-01-15 DIAGNOSIS — R79.89 HIGH SERUM PARATHYROID HORMONE (PTH): ICD-10-CM

## 2024-01-15 DIAGNOSIS — E55.9 VITAMIN D DEFICIENCY: Chronic | ICD-10-CM

## 2024-01-15 DIAGNOSIS — E78.2 MIXED HYPERLIPIDEMIA: ICD-10-CM

## 2024-01-15 DIAGNOSIS — N25.81 SECONDARY HYPERPARATHYROIDISM (HCC): ICD-10-CM

## 2024-01-15 DIAGNOSIS — Z00.00 ANNUAL PHYSICAL EXAM: Primary | ICD-10-CM

## 2024-01-15 PROBLEM — Z23 NEED FOR TETANUS BOOSTER: Status: RESOLVED | Noted: 2023-10-25 | Resolved: 2024-01-15

## 2024-01-15 PROCEDURE — 99214 OFFICE O/P EST MOD 30 MIN: CPT | Performed by: STUDENT IN AN ORGANIZED HEALTH CARE EDUCATION/TRAINING PROGRAM

## 2024-01-15 PROCEDURE — 99396 PREV VISIT EST AGE 40-64: CPT | Performed by: STUDENT IN AN ORGANIZED HEALTH CARE EDUCATION/TRAINING PROGRAM

## 2024-01-15 RX ORDER — ROSUVASTATIN CALCIUM 20 MG/1
20 TABLET, COATED ORAL DAILY
Qty: 90 TABLET | Refills: 1 | Status: SHIPPED | OUTPATIENT
Start: 2024-01-15 | End: 2024-04-14

## 2024-01-15 RX ORDER — AMLODIPINE BESYLATE 5 MG/1
5 TABLET ORAL DAILY
Qty: 90 TABLET | Refills: 1 | Status: SHIPPED | OUTPATIENT
Start: 2024-01-15

## 2024-01-15 NOTE — ASSESSMENT & PLAN NOTE
BP today elevated at 148/82   On amlodipine 5mg - reported compliance  Reports he had a small argument with someone before coming into our office which is likely contributory to his blood pressure being elevated at the time of my encounter.  -Will continue current regimen and monitor during future visits

## 2024-01-15 NOTE — ASSESSMENT & PLAN NOTE
Lab Results   Component Value Date    LDLCALC 134 (H) 01/12/2024    LDLCALC 76 01/23/2023     -We have agreed on increasing his rosuvastatin to 20 mg  -Repeated lipid panel in 3 months for his next visit

## 2024-01-15 NOTE — PROGRESS NOTES
"INTERNAL MEDICINE OFFICE VISIT NOTE  [facility] [place]    NAME: John Azevedo  AGE: 63 y.o. SEX: male    DATE OF ENCOUNTER: 1/15/2024       Chief Complaint   Patient presents with    Physical Exam     Yearly Physical / 3 month follow up      Patient presents for a comprehensive physical exam and f/u       Diet/Nutrition: well balanced diet.   Exercise: no formal exercise.   Sleep: sleeps well.   Hearing: normal - bilateral.  Vision: wears contacts.        Health  Symptoms include: none      Depression Screening  NO reported symptoms       Labs reviewed.         Review of Systems  10 point ROS negative except per HPI    OBJECTIVE:  Vitals:    01/15/24 1403   BP: 148/82   BP Location: Left arm   Patient Position: Sitting   Cuff Size: Standard   Pulse: 73   Temp: 98 °F (36.7 °C)   SpO2: 98%   Weight: 68 kg (150 lb)   Height: 5' 9\" (1.753 m)       Physical Exam:   GENERAL: NAD, Normal appearance. well-developed, well-nourished.   NEUROLOGIC:  Alert/oriented x3.  HEENT:  NC/AT, EOMI, , no scleral icterus  CARDIAC:  RRR, +S1/S2, no m/g/r  PULMONARY:  non-labored breathing, CTA B/L  ABDOMEN:  Soft, NT/ND, +BS  Extremities:  No edema  SKIN:  No rashes or erythema    ASSESSMENT/PLAN:    1. Annual physical exam    2. Mixed hyperlipidemia  -     rosuvastatin (CRESTOR) 20 MG tablet; Take 1 tablet (20 mg total) by mouth daily  -     Lipid Panel with Direct LDL reflex; Future    3. Secondary hyperparathyroidism (HCC)  -     PTH, intact; Future  -     Vitamin D 25 hydroxy; Future    4. Pulmonary emphysema, unspecified emphysema type (HCC)  Assessment & Plan:  Has been following with pulmonary however he missed his last visit due to some personal circumstances  On Anoro Ellipta and albuterol inhaler managed by pulmonary -continue      5. Primary hypertension  Assessment & Plan:  BP today elevated at 148/82   On amlodipine 5mg - reported compliance  Reports he had a small argument with someone before coming " into our office which is likely contributory to his blood pressure being elevated at the time of my encounter.  -Will continue current regimen and monitor during future visits    Orders:  -     amLODIPine (NORVASC) 5 mg tablet; Take 1 tablet (5 mg total) by mouth daily    6. High serum parathyroid hormone (PTH)  Assessment & Plan:  Lab Results   Component Value Date    PTH 72.5 05/17/2023    PTH 88.8 (H) 08/08/2022     Likely secondary to vitamin D deficiency  On vitamin D supplementation with a multivitamin with vitamin D  Will repeat a PTH and a vitamin D level.  Advised to complete before his next visit in 3 months.      7. Pure hypercholesterolemia  Assessment & Plan:  Lab Results   Component Value Date    LDLCALC 134 (H) 01/12/2024    LDLCALC 76 01/23/2023     -We have agreed on increasing his rosuvastatin to 20 mg  -Repeated lipid panel in 3 months for his next visit      8. Vitamin D deficiency  Assessment & Plan:  Lab Results   Component Value Date    [id number] 15.3 (L) 08/08/2022     -Currently on daily supplementation with multivitamin vitamin D  - repeat vitamin D level              Current Outpatient Medications:     albuterol (ProAir HFA) 90 mcg/act inhaler, Inhale 2 puffs every 6 (six) hours as needed for wheezing, Disp: 8.5 g, Rfl: 0    amLODIPine (NORVASC) 5 mg tablet, Take 1 tablet (5 mg total) by mouth daily, Disp: 90 tablet, Rfl: 1    latanoprost (XALATAN) 0.005 % ophthalmic solution, INSTILL 1 DROP IN BOTH EYES IN THE EVENING, Disp: , Rfl:     Multiple Vitamin (multivitamin) tablet, Take 1 tablet by mouth daily, Disp: , Rfl:     pantoprazole (PROTONIX) 40 mg tablet, TAKE ONE TABLET BY MOUTH EVERY DAY, Disp: 90 tablet, Rfl: 0    PreviDent 5000 Booster Plus 1.1 % PSTE, USE TO BRUSH TEETH 2-3 TIMES DAILY, Disp: , Rfl:     rosuvastatin (CRESTOR) 20 MG tablet, Take 1 tablet (20 mg total) by mouth daily, Disp: 90 tablet, Rfl: 1    umeclidinium-vilanterol (Anoro Ellipta) 62.5-25 mcg/actuation  inhaler, Inhale 1 puff daily, Disp: 60 blister, Rfl: 3    TCM Call       None          TCM Call       None               Miah Lange MD  [facility] [place]    * Please Note: This note was completed in part utilizing a dictation software.  Grammatical errors, random word insertions, spelling mistakes, and incomplete sentences may be an occasional consequence of this system secondary to software limitations, ambient noise, and hardware issues.  If you have any questions or concerns about the content, text, or information contained within the body of this dictation, please contact the provider for clarification.**

## 2024-01-15 NOTE — ASSESSMENT & PLAN NOTE
Lab Results   Component Value Date    [id number] 15.3 (L) 08/08/2022     -Currently on daily supplementation with multivitamin vitamin D  - repeat vitamin D level

## 2024-01-15 NOTE — ASSESSMENT & PLAN NOTE
Has been following with pulmonary however he missed his last visit due to some personal circumstances  On Anoro Ellipta and albuterol inhaler managed by pulmonary -continue

## 2024-01-15 NOTE — ASSESSMENT & PLAN NOTE
Lab Results   Component Value Date    PTH 72.5 05/17/2023    PTH 88.8 (H) 08/08/2022     Likely secondary to vitamin D deficiency  On vitamin D supplementation with a multivitamin with vitamin D  Will repeat a PTH and a vitamin D level.  Advised to complete before his next visit in 3 months.

## 2024-03-09 DIAGNOSIS — K21.9 GASTROESOPHAGEAL REFLUX DISEASE WITHOUT ESOPHAGITIS: ICD-10-CM

## 2024-03-11 RX ORDER — PANTOPRAZOLE SODIUM 40 MG/1
40 TABLET, DELAYED RELEASE ORAL DAILY
Qty: 90 TABLET | Refills: 1 | Status: SHIPPED | OUTPATIENT
Start: 2024-03-11

## 2024-04-23 ENCOUNTER — TELEPHONE (OUTPATIENT)
Dept: INTERNAL MEDICINE CLINIC | Facility: CLINIC | Age: 64
End: 2024-04-23

## 2024-08-03 DIAGNOSIS — E78.2 MIXED HYPERLIPIDEMIA: ICD-10-CM

## 2024-08-04 RX ORDER — ROSUVASTATIN CALCIUM 20 MG/1
20 TABLET, COATED ORAL DAILY
Qty: 30 TABLET | Refills: 5 | Status: SHIPPED | OUTPATIENT
Start: 2024-08-04

## 2024-09-09 DIAGNOSIS — I10 PRIMARY HYPERTENSION: ICD-10-CM

## 2024-09-10 RX ORDER — AMLODIPINE BESYLATE 5 MG/1
5 TABLET ORAL DAILY
Qty: 30 TABLET | Refills: 0 | Status: SHIPPED | OUTPATIENT
Start: 2024-09-10

## 2024-09-13 ENCOUNTER — TELEPHONE (OUTPATIENT)
Dept: FAMILY MEDICINE CLINIC | Facility: CLINIC | Age: 64
End: 2024-09-13